# Patient Record
(demographics unavailable — no encounter records)

---

## 2024-10-08 NOTE — HISTORY OF PRESENT ILLNESS
[Derm Symptoms] : DERM SYMPTOMS [Rash] : rash [New Formula] : no new formula [New Food] : no new food [New Clothing] : no new clothing [New Skin Products] : no new skin products [New Diapers] : no new diapers [Recent Antibiotic Use] : no recent antibiotic use [Hx of recent COVID-19 infection] : no history of recent COVID-19 infection [Sick Contacts: ___] : no sick contacts [de-identified] : Pt chest and belly is really red and bumps in his arms [FreeTextEntry6] : rash on abdomen and neck afebrile  no new formula started using eucerin cream  bathing every day

## 2024-10-08 NOTE — DISCUSSION/SUMMARY
[FreeTextEntry1] :  Recommend daily moisturizer and topical steroid prn for atopic dermatitis. less frequent baths  return in 1 week

## 2024-10-17 NOTE — DISCUSSION/SUMMARY
[FreeTextEntry1] :  Cool mist humidifier  Vicks vapor rub  Nasal saline spray q4h and suction   Advised parent to bring to ER in case of any respiratory distress, fever above 105, signs of dehydration. RTC if symptoms worsen or persist for longer than 3 days

## 2024-10-17 NOTE — HISTORY OF PRESENT ILLNESS
[EENT/Resp Symptoms] : EENT/RESPIRATORY SYMPTOMS [Nasal congestion] : nasal congestion [Runny nose] : runny nose [___ Day(s)] : [unfilled] day(s) [Playful] : playful [Wet cough] : wet cough [At Night] : at night [Runny Nose] : runny nose [Cough] : cough [Wheezing] : wheezing [Stable] : stable [Known exposure to COVID-19] : no known exposure to COVID-19 [Hx of recent COVID-19 infection] : no history of recent COVID-19 infection [Sick Contacts: ___] : no sick contacts [Change in sleep pattern] : no change in sleep pattern [Eye Redness] : no eye redness [Eye Discharge] : no eye discharge [Ear Tugging] : no ear tugging [Nasal Congestion] : no nasal congestion [Teething] : no teething [Decreased Appetite] : no decreased appetite [Posttussive emesis] : no posttussive emesis [Vomiting] : no vomiting [Diarrhea] : no diarrhea [Decreased Urine Output] : no decreased urine output [Rash] : no rash [Loss of taste] : no loss of taste [Loss of smell] : no loss of smell [FreeTextEntry6] : here for f/u eczema but now has nasal congestion and cough afebrile drinking bottles and making wet diapers

## 2024-12-05 NOTE — DEVELOPMENTAL MILESTONES
[Laughs aloud] : laughs aloud [Turns to voice] : turns to voice [Vocalizes with extending cooing] : vocalizes with extending cooing [Rolls over prone to supine] : rolls over prone to supine [Supports on elbows & wrists in prone] : supports on elbows and wrists in prone [Keeps hands unfisted] : keeps hands unfisted [Plays with fingers in midline] : plays with fingers in midline [Grasps objects] : grasps objects [Normal Development] : Normal Development [None] : none [Passed] : passed [Yes] : Completed.

## 2024-12-07 NOTE — DISCUSSION/SUMMARY
[Normal Growth] : growth [Normal Development] : development  [No Elimination Concerns] : elimination [Continue Regimen] : feeding [No Skin Concerns] : skin [Normal Sleep Pattern] : sleep [None] : no medical problems [Anticipatory Guidance Given] : Anticipatory guidance addressed as per the history of present illness section [Age Approp Vaccines] : Age appropriate vaccines administered [No Medications] : ~He/She~ is not on any medications [Parent/Guardian] : Parent/Guardian [Family Functioning] : family functioning [Nutritional Adequacy and Growth] : nutritional adequacy and growth [Infant Development] : infant development [Oral Health] : oral health [Safety] : safety [] : The components of the vaccine(s) to be administered today are listed in the plan of care. The disease(s) for which the vaccine(s) are intended to prevent and the risks have been discussed with the caretaker.  The risks are also included in the appropriate vaccination information statements which have been provided to the patient's caregiver.  The caregiver has given consent to vaccinate. [FreeTextEntry1] : Recommend breastfeeding, 8-12 feedings per day. Mother should continue prenatal vitamins and avoid alcohol. If formula is needed, recommend iron-fortified formulations, 2-4 oz every 3-4 hrs. Cereal may be introduced using a spoon and bowl. When in car, patient should be in rear-facing car seat in back seat. Put baby to sleep on back, in own crib with no loose or soft bedding. Lower crib matress. Help baby to maintain sleep and feeding routines. May offer pacifier if needed. Continue tummy time when awake. 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 referral for helmet eval

## 2024-12-07 NOTE — DISCUSSION/SUMMARY
[Normal Growth] : growth [Normal Development] : development  [No Elimination Concerns] : elimination [Continue Regimen] : feeding [No Skin Concerns] : skin [Normal Sleep Pattern] : sleep [None] : no medical problems [Anticipatory Guidance Given] : Anticipatory guidance addressed as per the history of present illness section [Age Approp Vaccines] : Age appropriate vaccines administered [No Medications] : ~He/She~ is not on any medications [Parent/Guardian] : Parent/Guardian [Family Functioning] : family functioning [Nutritional Adequacy and Growth] : nutritional adequacy and growth [Infant Development] : infant development [Oral Health] : oral health [Safety] : safety [] : The components of the vaccine(s) to be administered today are listed in the plan of care. The disease(s) for which the vaccine(s) are intended to prevent and the risks have been discussed with the caretaker.  The risks are also included in the appropriate vaccination information statements which have been provided to the patient's caregiver.  The caregiver has given consent to vaccinate. [FreeTextEntry1] : Recommend breastfeeding, 8-12 feedings per day. Mother should continue prenatal vitamins and avoid alcohol. If formula is needed, recommend iron-fortified formulations, 2-4 oz every 3-4 hrs. Cereal may be introduced using a spoon and bowl. When in car, patient should be in rear-facing car seat in back seat. Put baby to sleep on back, in own crib with no loose or soft bedding. Lower crib matress. Help baby to maintain sleep and feeding routines. May offer pacifier if needed. Continue tummy time when awake. 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 referral for helmet eval

## 2024-12-07 NOTE — PHYSICAL EXAM
[Alert] : alert [Normocephalic] : normocephalic [Flat Open Anterior [place]] : flat open anterior fontanelle [Red Reflex] : red reflex bilateral [PERRL] : PERRL [Normally Placed Ears] : normally placed ears [Auricles Well Formed] : auricles well formed [Clear Tympanic membranes] : clear tympanic membranes [Light reflex present] : light reflex present [Bony landmarks visible] : bony landmarks visible [Nares Patent] : nares patent [Palate Intact] : palate intact [Uvula Midline] : uvula midline [Symmetric Chest Rise] : symmetric chest rise [Clear to Auscultation Bilaterally] : clear to auscultation bilaterally [Regular Rate and Rhythm] : regular rate and rhythm [S1, S2 present] : S1, S2 present [+2 Femoral Pulses] : (+) 2 femoral pulses [Soft] : soft [Bowel Sounds] : bowel sounds present [Central Urethral Opening] : central urethral opening [Testicles Descended] : testicles descended bilaterally [Patent] : patent [Normally Placed] : normally placed [No Abnormal Lymph Nodes Palpated] : no abnormal lymph nodes palpated [Startle Reflex] : startle reflex present [Plantar Grasp] : plantar grasp reflex present [Symmetric Torsten] : symmetric torsten [Acute Distress] : no acute distress [Discharge] : no discharge [Palpable Masses] : no palpable masses [Murmurs] : no murmurs [Tender] : nontender [Distended] : nondistended [Hepatomegaly] : no hepatomegaly [Splenomegaly] : no splenomegaly [Boyer-Ortolani] : negative Boyer-Ortolani [Allis Sign] : negative Allis sign [Spinal Dimple] : no spinal dimple [Tuft of Hair] : no tuft of hair [Rash or Lesions] : no rash/lesions [de-identified] : dry shin througout

## 2024-12-07 NOTE — PHYSICAL EXAM
[Alert] : alert [Normocephalic] : normocephalic [Flat Open Anterior [place]] : flat open anterior fontanelle [Red Reflex] : red reflex bilateral [PERRL] : PERRL [Normally Placed Ears] : normally placed ears [Auricles Well Formed] : auricles well formed [Clear Tympanic membranes] : clear tympanic membranes [Light reflex present] : light reflex present [Bony landmarks visible] : bony landmarks visible [Nares Patent] : nares patent [Palate Intact] : palate intact [Uvula Midline] : uvula midline [Symmetric Chest Rise] : symmetric chest rise [Clear to Auscultation Bilaterally] : clear to auscultation bilaterally [Regular Rate and Rhythm] : regular rate and rhythm [S1, S2 present] : S1, S2 present [+2 Femoral Pulses] : (+) 2 femoral pulses [Soft] : soft [Bowel Sounds] : bowel sounds present [Central Urethral Opening] : central urethral opening [Testicles Descended] : testicles descended bilaterally [Patent] : patent [Normally Placed] : normally placed [No Abnormal Lymph Nodes Palpated] : no abnormal lymph nodes palpated [Startle Reflex] : startle reflex present [Plantar Grasp] : plantar grasp reflex present [Symmetric Torsten] : symmetric torsten [Acute Distress] : no acute distress [Discharge] : no discharge [Palpable Masses] : no palpable masses [Murmurs] : no murmurs [Tender] : nontender [Distended] : nondistended [Hepatomegaly] : no hepatomegaly [Splenomegaly] : no splenomegaly [Boyer-Ortolani] : negative Boyer-Ortolani [Allis Sign] : negative Allis sign [Spinal Dimple] : no spinal dimple [Tuft of Hair] : no tuft of hair [Rash or Lesions] : no rash/lesions [de-identified] : dry shin througout

## 2024-12-07 NOTE — HISTORY OF PRESENT ILLNESS
[Mother] : mother [Formula ___ oz/feed] : [unfilled] oz of formula per feed [Hours between feeds ___] : Child is fed every [unfilled] hours [Normal] : Normal [___ voids per day] : [unfilled] voids per day [Frequency of stools: ___] : Frequency of stools: [unfilled]  stools [per day] : per day. [Green/brown] : green/brown [Pasty] : pasty [In Bassinet/Crib] : sleeps in bassinet/crib [On back] : sleeps on back [Sleeps 12-16 hours per 24 hours (including naps)] : sleeps 12-16 hours per 24 hours (including naps) [Tummy time] : tummy time [Screen time only for video chatting] : screen time only for video chatting [No] : No cigarette smoke exposure [Water heater temperature set at <120 degrees F] : Water heater temperature set at <120 degrees F [Rear facing car seat in back seat] : Rear facing car seat in back seat [Carbon Monoxide Detectors] : Carbon monoxide detectors at home [Smoke Detectors] : Smoke detectors at home. [NO] : No [Vitamins ___] : no vitamins [Fruits] : no fruits [Vegetables] : no vegetables [Cereal] : no cereal [Co-sleeping] : no co-sleeping [Loose bedding, pillow, toys, and/or bumpers in crib] : no loose bedding, pillow, toys, and/or bumpers in crib [Pacifier use] : not using pacifier [Exposure to electronic nicotine delivery system] : No exposure to electronic nicotine delivery system

## 2025-01-17 NOTE — PHYSICAL EXAM
[Clear Rhinorrhea] : clear rhinorrhea [Congestion] : congestion [Clear to Auscultation Bilaterally] : clear to auscultation bilaterally [NL] : warm, clear

## 2025-01-17 NOTE — DISCUSSION/SUMMARY
[FreeTextEntry1] : Start [place] or [place] for cough Cool mist humidifier  Vicks vapor rub  Nasal saline spray q4h and suction Tylenol/Motrin for fever  Encourage fluids  RVP sent Advised parent to bring to ER in case of any respiratory distress, fever above 105, signs of dehydration. RTC if symptoms worsen or persist for longer than 3 days

## 2025-01-17 NOTE — HISTORY OF PRESENT ILLNESS
[FreeTextEntry6] : cough, nasal congestion and runny nose since Monday. afebrile no one sick at home has been to derm for eczema, it is improving  and went to ortho for 2nd opinion on clubbed feet, per mom told to wear opposite side shoes, not true clubfoot

## 2025-02-07 NOTE — PHYSICAL EXAM
[Alert] : alert [Normocephalic] : normocephalic [Flat Open Anterior [place]] : flat open anterior fontanelle [Red Reflex] : red reflex bilateral [PERRL] : PERRL [Normally Placed Ears] : normally placed ears [Auricles Well Formed] : auricles well formed [Clear Tympanic membranes] : clear tympanic membranes [Light reflex present] : light reflex present [Bony landmarks visible] : bony landmarks visible [Nares Patent] : nares patent [Palate Intact] : palate intact [Uvula Midline] : uvula midline [Supple, full passive range of motion] : supple, full passive range of motion [Symmetric Chest Rise] : symmetric chest rise [Clear to Auscultation Bilaterally] : clear to auscultation bilaterally [Regular Rate and Rhythm] : regular rate and rhythm [S1, S2 present] : S1, S2 present [+2 Femoral Pulses] : (+) 2 femoral pulses [Soft] : soft [Bowel Sounds] : bowel sounds present [Central Urethral Opening] : central urethral opening [Testicles Descended] : testicles descended bilaterally [Patent] : patent [Normally Placed] : normally placed [No Abnormal Lymph Nodes Palpated] : no abnormal lymph nodes palpated [Symmetric Buttocks Creases] : symmetric buttocks creases [Plantar Grasp] : plantar grasp reflex present [Cranial Nerves Grossly Intact] : cranial nerves grossly intact [Acute Distress] : no acute distress [Discharge] : no discharge [Tooth Eruption] : no tooth eruption [Palpable Masses] : no palpable masses [Murmurs] : no murmurs [Tender] : nontender [Distended] : nondistended [Hepatomegaly] : no hepatomegaly [Splenomegaly] : no splenomegaly [Boyer-Ortolani] : negative Boyer-Ortolani [Allis Sign] : negative Allis sign [Spinal Dimple] : no spinal dimple [Tuft of Hair] : no tuft of hair [Rash or Lesions] : no rash/lesions

## 2025-02-07 NOTE — DEVELOPMENTAL MILESTONES
[Pats or smiles at reflection] : pats or smiles at reflection [Begins to turn when name called] : begins to turn when name called [Babbles] : babbles [Rolls over prone to supine] : rolls over prone to supine [Reaches for object and transfers] : reaches for object and transfers [Rakes small object with 4 fingers] : rakes small object with 4 fingers [[place] small object on surface] : bangs small object on surface [Passed] : passed [Sits briefly without support] : does not sit briefly without support

## 2025-02-07 NOTE — HISTORY OF PRESENT ILLNESS
[Mother] : mother [Formula ___ oz/feed] : [unfilled] oz of formula per feed [Hours between feeds ___] : Child is fed every [unfilled] hours [Normal] : Normal [___ voids per day] : [unfilled] voids per day [Frequency of stools: ___] : Frequency of stools: [unfilled]  stools [per day] : per day. [In Bassinet/Crib] : sleeps in bassinet/crib [On back] : sleeps on back [Sleeps 12-16 hours per 24 hours (including naps)] : sleeps 12-16 hours per 24 hours (including naps) [Pacifier use] : Pacifier use [Tummy time] : tummy time [Screen time only for video chatting] : screen time only for video chatting [No] : No cigarette smoke exposure [Water heater temperature set at <120 degrees F] : Water heater temperature set at <120 degrees F [Rear facing car seat in back seat] : Rear facing car seat in back seat [Carbon Monoxide Detectors] : Carbon monoxide detectors at home [Smoke Detectors] : Smoke detectors at home. [NO] : No [Fruits] : no fruits [Vegetables] : no vegetables [Cereal] : no cereal [Egg] : no egg [Meat] : no meat [Fish] : no fish [Peanut] : no peanut [Dairy] : no dairy [Co-sleeping] : no co-sleeping [Loose bedding, pillow, toys, and/or bumpers in crib] : no loose bedding, pillow, toys, and/or bumpers in crib [Exposure to electronic nicotine delivery system] : No exposure to electronic nicotine delivery system

## 2025-04-03 NOTE — HISTORY OF PRESENT ILLNESS
[TextBox_4] : SHALINI is here today for evaluation. He is a healthy 7 month old child who was never circumcised. The prepuce has never retracted well. He has not had any infections but does have progressive ballooning of the prepuce with urination. He has had discomfort with urination at times.

## 2025-04-03 NOTE — CONSULT LETTER
[FreeTextEntry1] : Dear Dr. EVON WIGGINS,      I had the pleasure of consulting on SHALINI ROSALES today.  Below is my note regarding the office visit today.      Thank you so very much for allowing me to participate in SHALINI's care.  Please don't hesitate to call me should any questions or issues arise.        Sincerely,     Osmel Saels MD, FACS, [facility]    Chief, Pediatric Urology    Professor of Urology and Pediatrics    [employer]        President, American U[employer] Association - [place] Section    Past-President, Societies for Pediatric U[employer]

## 2025-04-03 NOTE — PHYSICAL EXAM
[TextBox_92] : PENIS: Straight uncircumcised penis with phimosis. Meatus not visible.   SCROTUM: Bilaterally symmetric testes in dependent position without palpable mass, hernia or hydrocele.

## 2025-04-03 NOTE — CONSULT LETTER
[FreeTextEntry1] : Dear Dr. EVON WIGGINS,      I had the pleasure of consulting on SHALINI ROSALES today.  Below is my note regarding the office visit today.      Thank you so very much for allowing me to participate in SHALINI's care.  Please don't hesitate to call me should any questions or issues arise.        Sincerely,     Osmel Seals MD, FACS, [facility]    Chief, Pediatric Urology    Professor of Urology and Pediatrics    [employer]        President, American U[employer] Association - [place] Section    Past-President, Societies for Pediatric Urology

## 2025-04-03 NOTE — CONSULT LETTER
[FreeTextEntry1] : Dear Dr. EVON WIGGINS,      I had the pleasure of consulting on SHALINI ROSALES today.  Below is my note regarding the office visit today.      Thank you so very much for allowing me to participate in SHALINI's care.  Please don't hesitate to call me should any questions or issues arise.        Sincerely,     Osmel Seals MD, FACS, [facility]    Chief, Pediatric Urology    Professor of Urology and Pediatrics    [employer]        President, American Urological Association - [place] Section    Past-President, Societies for Pediatric Urology

## 2025-04-03 NOTE — CONSULT LETTER
[FreeTextEntry1] : Dear Dr. EVON WIGGINS,      I had the pleasure of consulting on SHALINI ROSALES today.  Below is my note regarding the office visit today.      Thank you so very much for allowing me to participate in SHALINI's care.  Please don't hesitate to call me should any questions or issues arise.        Sincerely,     Omsel Seals MD, FACS, [facility]    Chief, Pediatric Urology    Professor of Urology and Pediatrics    [employer] [employer] [facility] [employer]        President, American Urological Association - [place] Section    Past-President, Societies for Pediatric Urology

## 2025-04-03 NOTE — ASSESSMENT
[FreeTextEntry1] : SHALINI has phimosis.  We discussed the condition and its implications and then the management options, including monitoring, use of medication, and circumcision. The risks and benefits and possible complications of each option (including but not limited to reaction to steroids, bleeding, injury, incomplete circumcision and need for additional injury) was discussed and all questions were answered.  The decision for circumcision was made.  Due to his age, the circumcision will be performed in the operating room under anesthesia.

## 2025-04-22 NOTE — HISTORY OF PRESENT ILLNESS
[Parents] : parents [Formula ___ oz/feed] : [unfilled] oz of formula per feed [Fruit] : fruit [Cereal] : cereal [Baby food] : baby food [Water] : water [Normal] : Normal [___ voids per day] : [unfilled] voids per day [Frequency of stools: ___] : Frequency of stools: [unfilled]  stools [per day] : per day. [Firm] : firm consistency [In Crib] : sleeps in crib [Sleeps 12-16 hours per 24 hours (including naps)] : sleeps 12-16 hours per 24 hours (including naps) [No] : Not at  exposure [Water heater temperature set at <120 degrees F] : Water heater temperature set at <120 degrees F [Rear facing car seat in  back seat] : Rear facing car seat in  back seat [Carbon Monoxide Detectors] : Carbon monoxide detectors [Smoke Detectors] : Smoke detectors [___ Feeding per 24 hrs] : a total of [unfilled] feedings is 24 hours [Up to date] : Up to date [NO] : No [Vegetables] : no vegetables [Meat] : no meat [Eggs] : no eggs [Fish] : no fish [Peanut] : no peanut [Dairy] : no dairy [Finger foods] : no finger foods [On back] : does not sleep on back [Co-sleeping] : no co-sleeping [Wakes up at night] : does not wake up at night [Loose bedding, pillow, toys, and/or bumpers in crib] : no loose bedding, pillow, toys, and/or bumpers in crib [Pacifier use] : not using pacifier [Sippy Cup use] : not using sippy cup [Bottle in bed] : not using bottle in bed [Brushing teeth] : not brushing teeth [Screen time only for video chatting] : screen time not just for video chatting [de-identified] : getting circ on 5/8/25  [de-identified] : [facility]

## 2025-04-22 NOTE — PHYSICAL EXAM
[Alert] : alert [Normocephalic] : normocephalic [Flat Open Anterior [place]] : flat open anterior fontanelle [Red Reflex] : red reflex bilateral [PERRL] : PERRL [Normally Placed Ears] : normally placed ears [Auricles Well Formed] : auricles well formed [Clear Tympanic membranes] : clear tympanic membranes [Light reflex present] : light reflex present [Bony landmarks visible] : bony landmarks visible [Nares Patent] : nares patent [Palate Intact] : palate intact [Uvula Midline] : uvula midline [Supple, full passive range of motion] : supple, full passive range of motion [Symmetric Chest Rise] : symmetric chest rise [Clear to Auscultation Bilaterally] : clear to auscultation bilaterally [Regular Rate and Rhythm] : regular rate and rhythm [S1, S2 present] : S1, S2 present [+2 Femoral Pulses] : (+) 2 femoral pulses [Soft] : soft [Bowel Sounds] : bowel sounds present [Central Urethral Opening] : central urethral opening [Testicles Descended] : testicles descended bilaterally [No Abnormal Lymph Nodes Palpated] : no abnormal lymph nodes palpated [Symmetric Abduction and Rotation of hips] : symmetric abduction and rotation of hips [Straight] : straight [Cranial Nerves Grossly Intact] : cranial nerves grossly intact [Acute Distress] : no acute distress [Excessive Tearing] : no excessive tearing [Discharge] : no discharge [Palpable Masses] : no palpable masses [Murmurs] : no murmurs [Tender] : nontender [Distended] : nondistended [Hepatomegaly] : no hepatomegaly [Splenomegaly] : no splenomegaly [Allis Sign] : negative Allis sign [Rash or Lesions] : no rash/lesions

## 2025-04-22 NOTE — DEVELOPMENTAL MILESTONES
[Uses basic gestures] : uses basic gestures [Says "Jarrell" or "Mama"] : says "Jarrell" or "Mama" nonspecifically [Sits well without support] : sits well without support [Transitions between sitting and lying] : transitions between sitting and lying [Balances on hands and knees] : balances on hands and knees [Picks up small objects with 3 fingers] : picks up small objects with 3 fingers and thumb [Normal Development] : Normal Development [None] : none [Yes] : Completed. [Crawls] : does not crawl [Releases objects intentionally] : does not release objects intentionally [[place] objects together] : does not bang objects together

## 2025-04-22 NOTE — DISCUSSION/SUMMARY
[Normal Growth] : growth [Normal Development] : development [None] : No known medical problems [No Elimination Concerns] : elimination [No Feeding Concerns] : feeding [No Skin Concerns] : skin [Normal Sleep Pattern] : sleep [Family Adaptation] : family adaptation [Infant Auglaize] : infant independence [Feeding Routine] : feeding routine [Safety] : safety [No Medications] : ~He/She~ is not on any medications [Parent/Guardian] : parent/guardian [FreeTextEntry1] : Continue breastmilk or formula as desired. Increase table foods, 3 meals with 2-3 snacks per day. Incorporate up to 6 oz of flourinated water daily in a sippy cup. Discussed weaning of bottle and pacifier. Wipe teeth daily with washcloth. When in car, patient should be in rear-facing car seat in back seat. Put baby to sleep in own crib with no loose or soft bedding. Lower crib mattress. Help baby to maintain consistent daily routines and sleep schedule. Recognize stranger anxiety. Ensure home is safe since baby is increasingly mobile. Be within arm's reach of baby at all times. Use consistent, positive discipline. Avoid screen time. Read aloud to baby.  medically cleared with surgery. Paper work faxed and provided to mother

## 2025-05-14 NOTE — PROCEDURE
[FreeTextEntry1] : Phimosis [FreeTextEntry2] : Phimosis  [FreeTextEntry3] : Circumcision  [FreeTextEntry4] : Uneventful case. 6-0 Monocryl sutures placed.  Telfa dressing placed [FreeTextEntry5] : None [FreeTextEntry6] : Dressing can removed later today or tomorrow. Apply Bacitracin to wound 3-4 x per day for 2 weeks Shower and bath is ok in 48 hours  Tylenol and/or Motrin as needed for pain Avoid straddle and strenuous activity for 2 weeks Send a picture of wound in a few weeks

## 2025-07-18 NOTE — HISTORY OF PRESENT ILLNESS
[Mother] : mother [Hours between feeds ___] : Child is fed every [unfilled] hours [Formula ___ oz/feed] : [unfilled] oz of formula per feed [Fruit] : fruit [Baby food] : baby food [Finger food] : finger food [___ stools per day] : [unfilled]  stools per day [Yellow] : stools are yellow color [Firm] : firm consistency [___ voids per day] : [unfilled] voids per day [Normal] : Normal [In crib] : In crib [Bottle in bed] : Bottle in bed [None] : Primary Fluoride Source: None [Playtime] : Playtime  [No] : Not at  exposure [Water heater temperature set at <120 degrees F] : Water heater temperature set at <120 degrees F [Car seat in back seat] : Car seat in back seat [Smoke Detectors] : Smoke detectors [Carbon Monoxide Detectors] : Carbon monoxide detectors [Up to date] : Up to date [NO] : No [Exposure to electronic nicotine delivery system] : No exposure to electronic nicotine delivery system [At risk for exposure to TB] : Not at risk for exposure to Tuberculosis

## 2025-07-18 NOTE — DEVELOPMENTAL MILESTONES
[Says "Dad" or "Mom" with meaning] : says "Dad" or "Mom" with meaning [Stands without support] : stands without support [Drops object in a cup] : drops object in a cup [Picks up small object with 2 finger] : picks up small object with 2 finger pincer grasp [Picks up food and eats it] : picks up food and eats it [Normal Development] : Normal Development [None] : none [Looks for hidden objects] : does not look for hidden objects [Imitates new gestures] : does not imitate new gestures [Uses one word other than Mom or] : does not use one word other than Mom or Dad or personal names [Follows a verbal command that] : does not follow a verbal command that includes a gesture [Takes first independent] : does not take first independent steps

## 2025-07-18 NOTE — DISCUSSION/SUMMARY
[Normal Growth] : growth [Normal Development] : development [None] : No known medical problems [No Elimination Concerns] : elimination [No Feeding Concerns] : feeding [No Skin Concerns] : skin [Normal Sleep Pattern] : sleep [Family Support] : family support [Establishing Routines] : establishing routines [Feeding and Appetite Changes] : feeding and appetite changes [Establishing A Dental Home] : establishing a dental home [Safety] : safety [No Medications] : ~He/She~ is not on any medications [Parent/Guardian] : parent/guardian [] : The components of the vaccine(s) to be administered today are listed in the plan of care. The disease(s) for which the vaccine(s) are intended to prevent and the risks have been discussed with the caretaker.  The risks are also included in the appropriate vaccination information statements which have been provided to the patient's caregiver.  The caregiver has given consent to vaccinate. [FreeTextEntry1] : Transition to whole cow's milk. Continue table foods, 3 meals with 2-3 snacks per day. Incorporate up to 6 oz of flourinated water daily in a sippy cup. Brush teeth twice a day with soft toothbrush. Recommend visit to dentist. When in car, keep child in rear-facing car seats until age 2, or until  the maximum height and weight for seat is reached. Put baby to sleep in own crib with no loose or soft bedding. Lower crib matress. Help baby to maintain consistent daily routines and sleep schedule. Recognize stranger and separation anxiety. Ensure home is safe since baby is increasingly mobile. Be within arm's reach of baby at all times. Use consistent, positive discipline. Avoid screen time. Read aloud to baby.  referral to EI for speech delay, sensory  Referral to neuro, abdnormal movements of hands and  feet

## 2025-07-18 NOTE — PHYSICAL EXAM
[Alert] : alert [Normocephalic] : normocephalic [Closed Anterior Curlew] : closed anterior fontanelle [Red Reflex] : red reflex bilateral [PERRL] : PERRL [Normally Placed Ears] : normally placed ears [Auricles Well Formed] : auricles well formed [Clear Tympanic membranes] : clear tympanic membranes [Light reflex present] : light reflex present [Bony landmarks visible] : bony landmarks visible [Nares Patent] : nares patent [Palate Intact] : palate intact [Uvula Midline] : uvula midline [Tooth Eruption] : tooth eruption [Supple, full passive range of motion] : supple, full passive range of motion [Symmetric Chest Rise] : symmetric chest rise [Clear to Auscultation Bilaterally] : clear to auscultation bilaterally [Regular Rate and Rhythm] : regular rate and rhythm [S1, S2 present] : S1, S2 present [+2 Femoral Pulses] : (+) 2 femoral pulses [Soft] : soft [Bowel Sounds] : normoactive bowel sounds [Central Urethral Opening] : central urethral opening [Testicles Descended] : testicles descended bilaterally [No Abnormal Lymph Nodes Palpated] : no abnormal lymph nodes palpated [Symmetric Abduction and Rotation of Hips] : symmetric abduction and rotation of hips [Straight] : straight [Cranial Nerves Grossly Intact] : cranial nerves grossly intact [Discharge] : no discharge [Palpable Masses] : no palpable masses [Murmurs] : no murmurs [Tender] : nontender [Distended] : nondistended [Hepatomegaly] : no hepatomegaly [Splenomegaly] : no splenomegaly [Allis Sign] : negative Allis sign [Rash or Lesions] : no rash/lesions